# Patient Record
Sex: MALE | Race: WHITE | URBAN - METROPOLITAN AREA
[De-identification: names, ages, dates, MRNs, and addresses within clinical notes are randomized per-mention and may not be internally consistent; named-entity substitution may affect disease eponyms.]

---

## 2021-06-02 ENCOUNTER — RECORDS - HEALTHEAST (OUTPATIENT)
Dept: ADMINISTRATIVE | Facility: CLINIC | Age: 31
End: 2021-06-02

## 2025-01-20 ENCOUNTER — TELEPHONE (OUTPATIENT)
Dept: BEHAVIORAL HEALTH | Facility: CLINIC | Age: 35
End: 2025-01-20

## 2025-01-20 NOTE — TELEPHONE ENCOUNTER
Writer spoke with pt on que and scheduled TC therapy appointment 01/22/2025 @ 3:30 pm. Coordinator will call to complete full registration. Teams message sent.    Evangelina Marcano  01/20/2025  332

## 2025-01-20 NOTE — TELEPHONE ENCOUNTER
Per request of TC Coordinator called patient back to finish registration for upcoming TC Therapy appointment.    Link sent to patient for [employer]. Encouraged patient to sign on to complete paperwork prior to appointment.     Odette Monique  Transition Clinic Coordinator  01/20/25 3:56 PM

## 2025-01-21 ENCOUNTER — TELEPHONE (OUTPATIENT)
Dept: BEHAVIORAL HEALTH | Facility: CLINIC | Age: 35
End: 2025-01-21

## 2025-01-21 NOTE — TELEPHONE ENCOUNTER
Writer called patient to remind them of appointment scheduled for  1/22/2025 . Spoke to patient regarding upcoming Transition Clinic appointment.  Appointment confirmed Screeners sent to email and encouraged.    Casandra Brower  Transition Clinic Coordinator  01/21/25 5:02 PM

## 2025-01-22 ENCOUNTER — TELEPHONE (OUTPATIENT)
Dept: BEHAVIORAL HEALTH | Facility: CLINIC | Age: 35
End: 2025-01-22

## 2025-01-22 ENCOUNTER — VIRTUAL VISIT (OUTPATIENT)
Dept: BEHAVIORAL HEALTH | Facility: CLINIC | Age: 35
End: 2025-01-22
Payer: COMMERCIAL

## 2025-01-22 DIAGNOSIS — F32.A DEPRESSION, UNSPECIFIED: Primary | ICD-10-CM

## 2025-01-22 PROCEDURE — 99207 PR DROP WITH A PROCEDURE: CPT | Mod: 25

## 2025-01-22 ASSESSMENT — ANXIETY QUESTIONNAIRES
1. FEELING NERVOUS, ANXIOUS, OR ON EDGE: NEARLY EVERY DAY
2. NOT BEING ABLE TO STOP OR CONTROL WORRYING: NEARLY EVERY DAY
5. BEING SO RESTLESS THAT IT IS HARD TO SIT STILL: NOT AT ALL
6. BECOMING EASILY ANNOYED OR IRRITABLE: NEARLY EVERY DAY
3. WORRYING TOO MUCH ABOUT DIFFERENT THINGS: NEARLY EVERY DAY
7. FEELING AFRAID AS IF SOMETHING AWFUL MIGHT HAPPEN: NEARLY EVERY DAY
GAD7 TOTAL SCORE: 15
GAD7 TOTAL SCORE: 15

## 2025-01-22 ASSESSMENT — COLUMBIA-SUICIDE SEVERITY RATING SCALE - C-SSRS
1. SINCE LAST CONTACT, HAVE YOU WISHED YOU WERE DEAD OR WISHED YOU COULD GO TO SLEEP AND NOT WAKE UP?: NO
ATTEMPT SINCE LAST CONTACT: NO
TOTAL  NUMBER OF ABORTED OR SELF INTERRUPTED ATTEMPTS SINCE LAST CONTACT: NO
6. HAVE YOU EVER DONE ANYTHING, STARTED TO DO ANYTHING, OR PREPARED TO DO ANYTHING TO END YOUR LIFE?: NO
2. HAVE YOU ACTUALLY HAD ANY THOUGHTS OF KILLING YOURSELF?: NO
TOTAL  NUMBER OF INTERRUPTED ATTEMPTS SINCE LAST CONTACT: NO
SUICIDE, SINCE LAST CONTACT: NO

## 2025-01-22 ASSESSMENT — PATIENT HEALTH QUESTIONNAIRE - PHQ9
5. POOR APPETITE OR OVEREATING: NOT AT ALL
SUM OF ALL RESPONSES TO PHQ QUESTIONS 1-9: 11

## 2025-01-22 NOTE — TELEPHONE ENCOUNTER
Writer spoke with pt on que and sent teams message for appointment link.    Evangelina Marcano  01/22/2025  215

## 2025-01-22 NOTE — PROGRESS NOTES
Transition Clinic - Initial Visit Progress Note    Patient  Name: Randall Jerry, : 1990    Date:  2025       Service Type:  Mental Health Initial Visit       VISIT TIME START: 330  VISIT TIME END: 415     Session Length:   TC Session Length: 45 (38-52 Minutes)    Visit #: 1    Attendees:  TC Attendees: Client alone    Service Modality:  Service Modality: Video Visit:      Provider verified identity through the following two step process.  Patient provided:  Patient  and Patient address    Telemedicine Visit: The patient's condition can be safely assessed and treated via synchronous audio and visual telemedicine encounter.      Reason for Telemedicine Visit: Patient convenience (e.g. access to timely appointments / distance to available provider)    Originating Site (Patient Location): Patient's home    Distant Site (Provider Location): [facility]    Consent:  The patient/guardian has verbally consented to: the potential risks and benefits of telemedicine (video visit) versus in person care; bill my insurance or make self-payment for services provided; and responsibility for payment of non-covered services.     Patient would like the video invitation sent by:  My Chart    Mode of Communication:  Video Conference via [facility]    Distant Location (Provider):  Off-site    As the provider I attest to compliance with applicable laws and regulations related to telemedicine.    Source of Referral:  Other - chart review does not indicate any referral source or referral note.      Informed Consent and Assessment Methods    This provider and patient discussed HIPAA, and the limits of confidentiality; including mandated reporting, the possibility of collaborative discussions with patient's primary care provider and the multidisciplinary team in the MH clinic during consultation.  Discussed the no show policy, and the benefits and possible unintended  consequences of therapy.  Patient indicated understanding Transition Clinic services are short term, solution focused, until a referral can be made and patient can bridge to long term therapy.  Patient verbally indicated understanding the informed consent.         Presenting Concerns/  Current Stressors:  Randall Jerry is a 34 year old who was referred to the Transition Clinic by chart review does not indicate any referral source or referral note.      Initial session:  Introduced transition clinic services as transitional, brief, short-term, solution-focused therapy until connected/transition to next LOC.    Per chart review, pt with a hx of unspecified depression and past substance use, no notable psych hosp hx, with information available in epic ehr at the time of this note.    Randall Jerry reports he self-referred himself with encouragement from his partner.  Pt reports substance relapse, now sober 2 weeks.  Pt reports remorse for this, writer and pt discussed addiction stages of change.  Pt reports a hx of anxiety and dep, pt reports looking for long term therapy.  Writer offered [facility] referrals, pt reports he will think about it and discuss with partner.  Pt reports mental health sxs related to stressors and relapse.  Writer  provided empathy, validation.  Pt denies current SI, plan, intent, SIB, HI, AH/VH, and/or kerry sxs, at this point in time.  CSSR completed.  Pt reports hopeful, looking forward to seeing partner.  Pt reports medication compliance, adequate supports, and denies current substance abuse.  Pt reports the following protective factors:  willingness to engage in therapy, attached by fam/friends, hopeful, identifies reason for living, access to and engagement with healthcare, current engagement in treatment and/or motivation to establish therapeutic relationship, strong bond to family unit, community, job, school, etc, supportive social network or family, fear of death or dying due to  pain/suffering, lives in a responsibly safe environment, responsibilities to others, and reality testing ability forward or future oriented thinking; dedication to family or friends; safe and stable environment; regular sleep; purpose; secure attachment; help seeking behaviors when distressed; adherence with prescribed medication; daily obligations; effective problem solving skills; commitment to well being; healthy fear of risky behaviors or pain, caring, educated, employed, goal-focused, good listener, has a previous history of therapy, insightful, intelligent, motivated, open to learning, open to suggestions / feedback, support of family, friends and providers, supportive, wants to learn, willing to ask questions, willing to relate to others, and work history .       Pt processed regarding sxs, relapse, relationship, supports, work, coping.  Writer provided active listening, provided empathy and validation.  Pt reports historical coping skills of exercise and socialization.  Writer and pt explored additional coping skills psychoeducation including mindfulness, relaxation strategies, stages of change, self-care and compassion.  Pt appeared engaged and receptive to the above interventions.    Pt requested the following referrals:     Scheduled Appointment  Date: Monday, 1/27/2025  Time: 4:00 pm - 5:00 pm  Provider: Michelle Perry  MS  [facility]  Location: [facility], [place] [address]  Phone: [phone]  Type: Teletherapy      [facility] follow up scheduled:  1/28/25 at 4pm - ensure successful transition to next LOC.      ASSESSMENT:    Required Screenings: The following assessments were completed by patient for this visit:  PHQ9:       1/22/2025     3:08 PM   PHQ-9 SCORE   PHQ-9 Total Score 11     GAD7:       1/22/2025     3:08 PM   CAROL-7 SCORE   Total Score 15     CAGE-AID:       1/22/2025     3:08 PM   CAGE-AID Total Score   Total Score 4     PROMIS 10-Global  Health (all questions and answers displayed):       1/22/2025     3:08 PM   PROMIS 10   In general, would you say your health is: 2   In general, would you say your quality of life is: 3   In general, how would you rate your physical health? 2   In general, how would you rate your mental health, including your mood and your ability to think? 1   In general, how would you rate your satisfaction with your social activities and relationships? 2   In general, please rate how well you carry out your usual social activities and roles. (This includes activities at home, at work and in your community, and responsibilities as a parent, child, spouse, employee, friend, etc.) 3   To what extent are you able to carry out your everyday physical activities such as walking, climbing stairs, carrying groceries, or moving a chair? 5   In the past 7 days, how often have you been bothered by emotional problems such as feeling anxious, depressed, or irritable? 5   In the past 7 days, how would you rate your fatigue on average? 3   In the past 7 days, how would you rate your pain on average, where 0 means no pain, and 10 means worst imaginable pain? 0   Global Mental Health Score 7   Global Physical Health Score 15   PROMIS TOTAL - SUBSCORES 22       [place] Suicide Severity Rating Scale (Short Version)      1/22/2025     4:00 PM   [place] Suicide Severity Rating (Short Version)   1. Wish to be Dead (Since Last Contact) N   2. Non-Specific Active Suicidal Thoughts (Since Last Contact) N   Actual Attempt (Since Last Contact) N   Has subject engaged in non-suicidal self-injurious behavior? (Since Last Contact) N   Interrupted Attempts (Since Last Contact) N   Aborted or Self-Interrupted Attempt (Since Last Contact) N   Preparatory Acts or Behavior (Since Last Contact) N   Suicide (Since Last Contact) N   Calculated C-SSRS Risk Score (Since Last Contact) No Risk Indicated       Mental Status Assessment:  Appearance:   Appropriate   Eye  Contact:   Good   Psychomotor Behavior: Normal   Attitude:   Cooperative   Orientation:   All  Speech     Rate / Production:  Normal/ Responsive     Volume:   Normal   Mood:    Normal  Affect:    Appropriate   Thought Content:  Clear   Thought Form:  Coherent  Logical   Insight:    Good       Safety Issues and Plan for Safety and Risk Management:     Patient denies current fears or concerns for personal safety.  Patient denies current or recent suicidal ideation or behaviors.  Patient denies current or recent homicidal ideation or behaviors.  Patient denies current or recent self injurious behavior or ideation.  Patient denies other safety concerns.  Recommended that patient call 911 or go to the local ED should there be a change in any of these risk factors     Diagnostic Criteria:  Unspecified depression    DSM5 Diagnoses: (Sustained by DSM5 Criteria Listed Above)  Diagnoses: 311 (F32.9) Unspecified Depressive Disorder   Psychosocial & Contextual Factors: Pt reports sober 2 weeks now, looking for therapy at this point in time.        Intervention:   Solution Focused Brief Therapy   Brief Psychotherapy - discussed and prioritizing the most efficient treatment., Cognitive Behavioral Therapy (CBT) - Discussed changing thoughts is the path to changing behaviors and feelings., Mindfulness Therapy - Cultivation of present-oriented, non-judgmental attitude. It helps nurtures great awareness, clarity, and acceptance of reality., Motivational Interviewing - helping to find the motivation to make positive behavior changes., Person-Centered Therapy - Actualizes potential and moves towards increased awareness, spontaneity, trust in self and inner directedness., and Problem-Solving Therapy (PST) - Identify specific problems; brainstorming, evaluation, implementing and reviewing solutions.    Collateral Reports Completed:  TC Collateral: [facility] electronic medical records reviewed.    DATA:  Interactive Complexity:  No  Crisis: No    PLAN: (Homework, other):  Provider will continue Diagnostic Assessment. Initial Treatment will focus on: Depressed Mood - anx .  2.   Pt requested the following referrals:     Scheduled Appointment  Date: Monday, 1/27/2025  Time: 4:00 pm - 5:00 pm  Provider: Michelle Perry  MS  [facility]  Location: [facility], Telehealth Only, [place] [address]  Phone: [phone]  Type: Teletherapy    3.   Information in this assessment was obtained from the medical record and provided by patient who is a good historian.   4.   Follow up scheduled:  1/28/25 at 4pm        Patient was given the following to do until next session:  Encourage challenge negative thoughts, mindfulness, self-care  5.  Anticipated Discharge: Anticipated Discharge Time: < 1 Month   6. Is this the patient's last discharge: No      Procedure Code:  Psychotherapy (with patient) - 45 [CPT 07891]    RADHA MUELLER    Suicide Risk and Safety Concerns were assessed for. Patient meets the following risk assessment and triage: Patient has no change in safety concerns. Committed to safety and agreed to follow previously developed safety plan.      Safety Plan:  Reviewed safety plan with pt, pt agrees to follow, safety plan in this note and [facility].  If unable to follow safety plan call 911.    Jose Martin Macias Safety Plan    STEP 1: WARNING SIGNS  overwhelmed  2. stress    STEP 2: INTERNAL COPING STRATEGIES - THINGS I CAN DO TO TAKE MY MIND OFF   MY PROBLEMS WITHOUT CONTACTING ANOTHER PERSON  relaxation  2.- socialization    STEP 3: PEOPLE AND SOCIAL SETTINGS THAT PROVIDE DISTRACTION  STEP 4: PEOPLE WHOM I CAN ASK FOR HELP DURING A CRISIS    1. Name: Contact: Fiance - number in phone  3. Place: 4. Place: outside/nature  1. Name: Contact: CRISIS:  [facility]:  [phone]    STEP 5: PROFESSIONALS OR AGENCIES I CAN CONTACT DURING A CRISIS:  1 .  911  2.  Emergency Dept    STEP 6: MAKING THE ENVIRONMENT SAFER (PLAN  "FOR LETHAL MEANS SAFETY)  1. Restrict access to means  2.  Stay with friends/family    Nancy Safety Plan. Cynthia Philippe and Anjel Macias. Used with permission of the  authors.      Grounding Techniques:  Try to notice where you are, your surroundings including the people, the sounds like the TV or  radio.  Concentrate on your breathing. Take a deep cleansing breath from your diaphragm. Count the  breaths as you exhale. Make sure you breath slowly.  Hold something that you find comforting, for some it may be a stuffed animal or a blanket. Notice  how it feels in your hands. Is it hard or soft?  During a non-crisis time make a list of positive affirmations. Print them out and keep them handy  for times of intense anxiety. At those times, read them aloud.  Try the [employer] game:  Name 5 things you can see in the room with you  Name 4 things you can feel (\"chair on my back\" or \"feet on floor\")  Name 3 things you can hear right now (\"people talking\" or \"tv\")  Name 2 things you can smell right now (or, 2 things you like the smell of)  Name 1 good thing about yourself  Create A Safe Place  Image a safe place - it can be a real or imaginary place:  What do you see - especially colors?  What sounds do you hear?  What sensations do you feel?  What smells do you smell?  What people or animals would you want in your safe place?  Imagine a protective bubble, wall or boundary around your safe place.  Imagine a door or gate with a guard at your safe place.  Image a lock and key to your safe place and only you can unlock it.  You can draw or make a collage that represents your safe place.  Choose a souvenir of your safe place - a color, an object, a song.  Keep your image of your safe place so you can come back to it when you need to.  Reduce Extreme Emotion QUICKLY: Changing Your Body Chemistry  T: Change your body Temperature to change your autonomic nervous system  Use Ice Water to calm yourself down FAST  Put your " "face in a bowl of ice water (this is the best way; have the person keep his/her face in ice  water for 30-45 seconds - initial research is showing that the longer s/he can hold her/his face in  the water, the better the response), or  Splash ice water on your face, or hold an ice pack on your face  I: Intensely exercise to calm down a body revved up by emotion  Examples: running, walking fast, jumping, playing basketball, weight lifting, swimming, calisthenics,  etc.  Engage in exercises that DO NOT include violent behaviors. Exercises that utilize violent behaviors  tend to function as \"behavioral rehearsal,\" and rather than calming the person down, may actually  \"rev\" the person up more, increasing the likelihood of violence, and lessening the likelihood that  they will \"burn off\" energy  Nancy Safety Plan (continued)  P: Progressively relax your muscles  Starting with your hands, moving to your forearms, upper arms, shoulders, neck, forehead, eyes,  cheeks and lips, tongue and teeth, chest, upper back, stomach, buttocks, thighs, calves, ankles,  feet  Tense (10 seconds,   of the way), then relax each muscle (all the way)  Notice the tension  Notice the difference when relaxed (by tensing first, and then relaxing, you are able to get a more  thorough relaxation than by simply relaxing)  P: Paced breathing to relax  The standard technique is to begin with counting the number of steps one takes for a typical inhale,  then counting the steps one takes for a typical exhale, and then lengthening the amount of steps  for the exhalation by one or two steps. OR  Repeat this pattern for 1-2 minutes  Inhale for four (4) seconds  Exhale for six (6) to eight (8) seconds  Research demonstrated that one can change one's overall level of anxiety by doing this exercise  for even a few minutes per day    Nancy Safety Plan. Cynthia Philippe and Anjel Macias. Used with permission of the  authors.        "

## 2025-01-27 ENCOUNTER — TELEPHONE (OUTPATIENT)
Dept: BEHAVIORAL HEALTH | Facility: CLINIC | Age: 35
End: 2025-01-27

## 2025-01-27 NOTE — TELEPHONE ENCOUNTER
Appointment reminder voicemail.    Odette Monique  Transition Clinic Coordinator  01/27/25 1:46 PM

## 2025-01-28 ENCOUNTER — TELEPHONE (OUTPATIENT)
Dept: BEHAVIORAL HEALTH | Facility: CLINIC | Age: 35
End: 2025-01-28

## 2025-01-28 NOTE — TELEPHONE ENCOUNTER
Writer spoke with pt on que and rescheduled TC therapy visit for today to 01/30/2025 @ 4:30 pm.    Evangelina Marcano  01/27/2025  1211

## 2025-01-29 ENCOUNTER — TELEPHONE (OUTPATIENT)
Dept: BEHAVIORAL HEALTH | Facility: CLINIC | Age: 35
End: 2025-01-29

## 2025-01-29 NOTE — TELEPHONE ENCOUNTER
Writer called patient to remind them of appointment scheduled for  1/30/2025 . Left message for patient.  Message stated appointment details and Transition Clinic number to call back with any questions or rescheduling needs.  Screeners emailed to patient.     Casandra Brower  Transition Clinic Coordinator  01/29/25 3:15 PM

## 2025-01-30 ENCOUNTER — TELEPHONE (OUTPATIENT)
Dept: BEHAVIORAL HEALTH | Facility: CLINIC | Age: 35
End: 2025-01-30

## 2025-01-30 NOTE — TELEPHONE ENCOUNTER
Writer spoke with pt on que and sent teams message for appointment link.    Evangelina Marcano  01/30/2025  415